# Patient Record
Sex: MALE | Race: WHITE
[De-identification: names, ages, dates, MRNs, and addresses within clinical notes are randomized per-mention and may not be internally consistent; named-entity substitution may affect disease eponyms.]

---

## 2018-10-15 ENCOUNTER — HOSPITAL ENCOUNTER (OUTPATIENT)
Dept: HOSPITAL 61 - PCVCIMAG | Age: 53
Discharge: HOME | End: 2018-10-15
Attending: INTERNAL MEDICINE
Payer: COMMERCIAL

## 2018-10-15 DIAGNOSIS — N28.1: ICD-10-CM

## 2018-10-15 DIAGNOSIS — E11.9: Primary | ICD-10-CM

## 2018-10-15 PROCEDURE — 76700 US EXAM ABDOM COMPLETE: CPT

## 2018-10-15 PROCEDURE — 93325 DOPPLER ECHO COLOR FLOW MAPG: CPT

## 2018-10-15 PROCEDURE — 93351 STRESS TTE COMPLETE: CPT

## 2018-10-15 NOTE — PCVCIMAG
EXAM: ABDOMINAL ULTRASOUND COMPLETE



INDICATION: Abdominal pain



FINDINGS: 

Gallbladder: No gallstones. No wall thickening or abnormal

pericholecystic fluid.

Liver: Diffusely increased echogenicity most compatible with diffuse

fatty infiltration or parenchymal disease. Exam the liver somewhat

technically limited because of this and the patient's body habitus.

Bile ducts: No obvious intra or extra hepatic bile duct dilatation.

The common bile duct is not well seen.

Pancreas: Unremarkable where seen.

Spleen: Normal in size measuring 12.4 cm in greatest dimension. No

focal masses.

Right kidney: No hydronephrosis. Length measures 11.5 cm. 1.5 x 1.6 x

1.8 cm benign cyst inferior pole.

Left kidney: No hydronephrosis. Length measures 13.6 cm. Several

benign cysts in the kidney the largest in the upper pole measuring 4.8

cm.

Inferior vena cava: Normal in size where seen.

Aorta: Normal in caliber where seen.



IMPRESSION: 

Gallbladder is normal in appearance.

No evidence of gallstones.

Presumed diffuse fatty infiltration or parenchymal disease in the

liver as described. Correlation with liver function studies may be of

value.



LOC:[id number]

## 2018-10-16 NOTE — PCVCIMAG
--------------- APPROVED REPORT --------------





Study performed:  10/15/2018 09:38:44



Exam:  Stress Echocardiogram

Indication: Hyperlipidemia, CAD s/p stent, Hypertension

Stress Nurse: Michelle Finn RN

Status: routine



Ht: 6 ft 0 in  

HR: 92 bpm      BP: 140/92 mmHg

Rhythm: NSR



Medical History

Medical History: CAD s/p stent, Diabetes



Procedure

The patient underwent an Exercise Stress Test using the James 

Protocol. Blood pressure, heart rate, and EKG were monitored.

An Echocardiogram was performed by technician in four stages in quad 

fashion.  At peak stress, four selected images were obtained and 

placed side by side with resting images for comparison.



Stress Test Details

Stress Test:  Exercise stress testing was performed using a James 

protocol.

HR

Resting HR:            92 bpmMax Heart Rate (APMHR): 168 bpm 

Max HR Achieved:  155 bpmTarget HR (85% APMHR): 142 bpm

% of APMHR:         92

Recovery HR:            99 bpm

HR response to stress: Normal HR response to stress



BP

Resting BP:  140/92 mmHg

Max BP:       200/90 mmHg

Recovery BP:       142/88 mmHg



ECG

Resting ECG:  Sinus Rhythm

Stress ECG:     Sinus Rhythm

Arrhythmia:    PVC's

Recovery ECG: Sinus Rhythm



Clinical

Reason for Termination: Maximal effort

Exercise duration: 6 min 28 sec

Highest Stage Achieved: Stage 3: 3.4 mph at 14% grade. 

Exercise capacity: 8.30 METs

Overall Exercise Capacity for Age: Average



Stress ECG Conclusion

ECG: Non-ischemic

Clinical: Non-ischemic



Pre-Stress Echo

The resting Echocardiogram showed normal left ventricular 

contractility with an estimated Ejection Fraction of about &gt;55%. 

Normal wall motion in all segments on baseline images.



Post-Stress Echo

The stress Echocardiogram showed normal left ventricular 

contractility with an estimated Ejection Fraction of about 60-65%. 

Normal augmentation of wall motion in all segments on post stress 

images.



Clinical

No clinical or ECG evidence for ischemia.



Conclusion

Clinical Response:  Non-ischemic

Exercise Capacity:  Average

Stress ECG Response:  Non-ischemic

Stress Echo Images:  Non-ischemic

The left ventricle is normal in size and wall thickness in both the 

rest and stress images.



Other Information

Study Quality: Adequate



&lt;Conclusion&gt;

The left ventricle is normal in size and wall thickness in both the 

rest and stress images.

## 2019-02-04 ENCOUNTER — HOSPITAL ENCOUNTER (OUTPATIENT)
Dept: HOSPITAL 61 - PCVCIMAG | Age: 54
Discharge: HOME | End: 2019-02-04
Attending: INTERNAL MEDICINE
Payer: COMMERCIAL

## 2019-02-04 DIAGNOSIS — R42: ICD-10-CM

## 2019-02-04 DIAGNOSIS — G47.33: ICD-10-CM

## 2019-02-04 DIAGNOSIS — Z90.5: ICD-10-CM

## 2019-02-04 DIAGNOSIS — I10: ICD-10-CM

## 2019-02-04 DIAGNOSIS — R06.02: ICD-10-CM

## 2019-02-04 DIAGNOSIS — E66.9: ICD-10-CM

## 2019-02-04 DIAGNOSIS — E11.9: ICD-10-CM

## 2019-02-04 DIAGNOSIS — R07.9: ICD-10-CM

## 2019-02-04 DIAGNOSIS — R10.9: ICD-10-CM

## 2019-02-04 DIAGNOSIS — I25.10: Primary | ICD-10-CM

## 2019-02-04 DIAGNOSIS — R06.00: ICD-10-CM

## 2019-02-04 PROCEDURE — 93351 STRESS TTE COMPLETE: CPT

## 2019-02-04 PROCEDURE — 93325 DOPPLER ECHO COLOR FLOW MAPG: CPT

## 2019-02-04 NOTE — PCVCIMAG
--------------- APPROVED REPORT --------------





Study performed:  02/04/2019 10:30:57



Exam:  Stress Echocardiogram

Indication: CAD, Stent, Dyspnea

Patient Location: Echo lab

Stress Nurse: Nupur Trivedi RN

Status: routine



Ht: 6 ft 1 in  

HR: 88 bpm      BP: 118/100 mmHg

Rhythm: NSR



Medical History

Medical History: CAD s/p stent



Procedure

The patient underwent an Exercise Stress Test using the James Protocol. Blood pressure, 

heart rate, and EKG were monitored.

An Echocardiogram was performed by technician in four stages in quad fashion.  At peak 

stress, four selected images were obtained and placed side by side with resting images 

for comparison.



Stress Test Details

Stress Test:  Exercise stress testing was performed using a James 

protocol.

HR

Resting HR:            88 bpmMax Heart Rate (APMHR): 168 bpm 

Max HR Achieved:  153 bpmTarget HR (85% APMHR): 142 bpm

% of APMHR:         91

Recovery HR:            99 bpm

HR response to stress: Normal HR response to stress



BP

Resting BP:  118/100 mmHg

Max BP:       200/98 mmHg

Recovery BP:       168/98 mmHg

BP response to stress: Normal blood pressure response to 

stress.

ECG

Resting ECG:  Sinus Rhythm

Stress ECG:     Sinus Rhythm

Recovery ECG: Sinus Rhythm



Clinical

Reason for Termination: Maximal effort

Exercise duration: 5 min 25 sec

Highest Stage Achieved: Stage 2: 2.5 mph at 12% grade. 

Exercise capacity: 7.00 METs

Overall Exercise Capacity for Age: Poor



Pre-Stress Echo

The resting Echocardiogram showed normal left ventricular contractility with an estimated 

Ejection Fraction of about 55-60%. 

Normal wall motion in all segments on baseline images.



Post-Stress Echo

The stress Echocardiogram showed normal left ventricular contractility with an estimated 

Ejection Fraction of about 60-65%. 

Normal augmentation of wall motion in all segments on post stress images.



Clinical

No clinical or ECG evidence for ischemia.



Conclusion

Clinical Response:  Non-ischemic

Exercise Capacity:  Below Average

Stress ECG Response:  Non-ischemic

Stress Echo Images:  Non-ischemic

The left ventricle is normal in size and wall thickness in both the rest and stress 

images.



Other Information

Study Quality: Technically Limited



<Conclusion>

The left ventricle is normal in size and wall thickness in both the rest and stress 

images.